# Patient Record
(demographics unavailable — no encounter records)

---

## 2025-02-13 NOTE — HISTORY OF PRESENT ILLNESS
[FreeTextEntry1] : EMILY ESPINOSA is a 30 yo male with past medical history of Graves hyperthyroidism who presents for follow up of hyperthyroidism.  This is my first visit with this patient.  he states that he is currently taking MMI 10 mg once daily since Oct/November 2024  Current regimen -  At last endocrine visit in April 2024, he was advised to continue Methimazole 5mg ever other daily. He is compliant with his thyroid medication and noted no issues today. He noted fatigue as of late and noted on physical his left eye was more blurry, will plan to see ophthalmologist. Today he denies any new complaints including any rash, fever or sore throat. He is seeing therapist for eating disorder, trying to cope with stress as best he can